# Patient Record
Sex: FEMALE
[De-identification: names, ages, dates, MRNs, and addresses within clinical notes are randomized per-mention and may not be internally consistent; named-entity substitution may affect disease eponyms.]

---

## 2018-01-01 ENCOUNTER — RX ONLY (RX ONLY)
Age: 63
End: 2018-01-01

## 2019-01-01 ENCOUNTER — RX ONLY (RX ONLY)
Age: 64
End: 2019-01-01

## 2024-07-16 ENCOUNTER — APPOINTMENT (OUTPATIENT)
Dept: URBAN - METROPOLITAN AREA CLINIC 207 | Age: 69
Setting detail: DERMATOLOGY
End: 2024-07-16

## 2024-07-16 ENCOUNTER — APPOINTMENT (OUTPATIENT)
Dept: URBAN - METROPOLITAN AREA CLINIC 207 | Age: 69
Setting detail: DERMATOLOGY
End: 2024-07-22

## 2024-07-16 DIAGNOSIS — L72.0 EPIDERMAL CYST: ICD-10-CM

## 2024-07-16 DIAGNOSIS — L82.1 OTHER SEBORRHEIC KERATOSIS: ICD-10-CM

## 2024-07-16 DIAGNOSIS — L57.8 OTHER SKIN CHANGES DUE TO CHRONIC EXPOSURE TO NONIONIZING RADIATION: ICD-10-CM

## 2024-07-16 DIAGNOSIS — D18.0 HEMANGIOMA: ICD-10-CM

## 2024-07-16 DIAGNOSIS — D22 MELANOCYTIC NEVI: ICD-10-CM

## 2024-07-16 DIAGNOSIS — L81.4 OTHER MELANIN HYPERPIGMENTATION: ICD-10-CM

## 2024-07-16 PROBLEM — D22.5 MELANOCYTIC NEVI OF TRUNK: Status: ACTIVE | Noted: 2024-07-16

## 2024-07-16 PROBLEM — D18.01 HEMANGIOMA OF SKIN AND SUBCUTANEOUS TISSUE: Status: ACTIVE | Noted: 2024-07-16

## 2024-07-16 PROCEDURE — OTHER DEFER: OTHER

## 2024-07-16 PROCEDURE — 99203 OFFICE O/P NEW LOW 30 MIN: CPT

## 2024-07-16 PROCEDURE — OTHER MIPS QUALITY: OTHER

## 2024-07-16 PROCEDURE — OTHER COUNSELING: OTHER

## 2024-07-16 PROCEDURE — OTHER SUNSCREEN RECOMMENDATIONS: OTHER

## 2024-07-16 ASSESSMENT — LOCATION DETAILED DESCRIPTION DERM
LOCATION DETAILED: RIGHT MEDIAL EYEBROW
LOCATION DETAILED: MID TRAPEZIAL NECK
LOCATION DETAILED: RIGHT ANTERIOR SHOULDER
LOCATION DETAILED: RIGHT SUPERIOR LATERAL MIDBACK
LOCATION DETAILED: LEFT ANTERIOR SHOULDER
LOCATION DETAILED: LEFT LATERAL SUPERIOR CHEST
LOCATION DETAILED: UPPER STERNUM
LOCATION DETAILED: RIGHT MEDIAL FOREHEAD

## 2024-07-16 ASSESSMENT — LOCATION SIMPLE DESCRIPTION DERM
LOCATION SIMPLE: LEFT SHOULDER
LOCATION SIMPLE: RIGHT FOREHEAD
LOCATION SIMPLE: CHEST
LOCATION SIMPLE: RIGHT EYEBROW
LOCATION SIMPLE: TRAPEZIAL NECK
LOCATION SIMPLE: RIGHT SHOULDER
LOCATION SIMPLE: RIGHT LOWER BACK

## 2024-07-16 ASSESSMENT — LOCATION ZONE DERM
LOCATION ZONE: FACE
LOCATION ZONE: TRUNK
LOCATION ZONE: ARM
LOCATION ZONE: NECK

## 2024-07-16 NOTE — PROCEDURE: SUNSCREEN RECOMMENDATIONS
Products Recommended: Sun protective clothing of all colors can be found at sites like Coolibar, Solumbra, Solbari, UVSkinz, Naviskin, Uniglo, [place], Luminara, BloqUV\\nOutdoor brands like Lands End, Athleta, LL. Bean, Ajay Russell, [place], MARLENE, Free Fly, [place], Under Hurley Sun Hurley\\nFashion brands like Sun50, Karin Pulitzer, Mott50, Baleaf, Epoque Evolution, J. Crew, Arlet Nader\\nSun hats at [place] hats, Mott50, Sun'n'sand, Hortencia Ryan, [place], Sunhatsbyroni, Coolibar and above outdoor brands\\n\\nSunscreen Recommendations for All Skin Colors (* = mineral based)\\nPurchase Here:\\nEltaMD Clear SPF 46; Also comes Clear Tinted (for oily/acne/rosacea/seb derm/sensitive skin)\\nEltaMD Daily (for dry skin)\\n*Colorescience Face Shield 50 (a fave)\\n*ISDIN Eryfotona Actinica (all skin types, helps with precancers)\\n*Colorscience, Supergoop and ISDIN powder brushes (great for reapplication)\\n*Skinbetter products (stick for active/sweating)\\n*Revision Intellishade (tinted; Matte and Original options; Antiaging)\\n\\nFrom Local Store:\\nCeraVe Ultra Light Moisturizing lotion SPF 30 (acne prone; very sheer)\\nCeraVe AM (what I use; good for normal or combination acne-prone)\\n*CeraVe Hydrating Mineral SPF (tinted)\\nCetaphil Pro Oil Control SPF 30 (acne prone)\\nNeutrogena Clear Face SPF 55 (acne prone)\\n*Neutrogena with Purescreen technology (good for sensitive skin; also has tinted version)\\nSupergoop Glow Screen (tinted)\\n*Supergoop Mineral Sheer Screen (also serves as a primer before makeup)\\n\\nKorean and Japanese Sunscreens From Amazon and [email or web address]- Good for Acne prone, sunburn-prone skin. Have wonderful light-weight textures that disappear into the skin!\\n[facility] - Relief Sun\\nBeauty of [place] - Matte Sun Stick\\nSKIN 1004 - [place] Centella Hyalu-Cica Water-Fit Sun Serum\\n[place]ntree - Hyaluronic Acid Watery Sun Gel\\n[employer] LAB - Birch Juice Moisturizing Sunscreen\\nBeauty of [place] - Relief Sun\\n[place]ao - Biore UV Aqua Rich Watery Essence Sunscreen SPF 50+ PA++++ \\n \\nFor Very Sensitive skin:\\n*Vanicream (another fave)\\n*Sunbum mineral\\n*Blue lizard sensitive\\nAveeno with feverfew moisturizer (rosacea)\\n\\nPowder sunscreens, good for reapplication and for scalp areas:\\n*Supergoop Re(setting) Mineral powder\\n*Supergoop Poof Part Powder\\n*Isdinceutics Mineral Brush\\n*Colorscience Sunforgettable Mineral Powder\\n\\nOther favorites for skin of color: *TIZO3 mineral tinted *Coola mineral or tinted Solbar *Topix Sheer Physical *Alastin Hydratint *It Cosmetics CC+ cream (also is antiaging)\\n\\nFor concerns with hyperpigmentation (skin darkening), look for tinted sunscreens with iron oxide protection from blue light - Needed for treating Melasma! Need to reapply every 2 hours, so I recommend one of the powder sunscreens for that. Add topical vitamin C and computer bluescreen protector!! Remember, visible light matters too.\\n\\nFor MEN, I'll usually recommend one of the Korean or Japanese Sunscreens (see above), Elta MD Clear or Isdin Eryfotona Actinica. Other good ones are CeraVe Ultra Light, Supergoop Unseen, La Roche Posay Melt in Milk
General Sunscreen Counseling: I recommended a broad spectrum sunscreen with a SPF of 30 or higher.  I explained that SPF 30 sunscreens block approximately 97 percent of the sun's harmful rays.  Sunscreens should be applied at least 15 minutes prior to expected sun exposure and then every 2 hours after that as long as sun exposure continues. If swimming or exercising sunscreen should be reapplied every 45 minutes to an hour after getting wet or sweating.  One ounce, or the equivalent of a shot glass full of sunscreen, is adequate to protect the skin not covered by a bathing suit. I also recommended a lip balm with a sunscreen as well. Sun protective clothing can be used in lieu of sunscreen but must be worn the entire time you are exposed to the sun's rays.
Detail Level: Simple